# Patient Record
Sex: MALE | Race: WHITE | Employment: FULL TIME | URBAN - METROPOLITAN AREA
[De-identification: names, ages, dates, MRNs, and addresses within clinical notes are randomized per-mention and may not be internally consistent; named-entity substitution may affect disease eponyms.]

---

## 2019-07-03 ENCOUNTER — OP HISTORICAL/CONVERTED ENCOUNTER (OUTPATIENT)
Dept: OTHER | Age: 54
End: 2019-07-03

## 2022-07-17 ENCOUNTER — APPOINTMENT (OUTPATIENT)
Dept: GENERAL RADIOLOGY | Age: 57
End: 2022-07-17
Attending: EMERGENCY MEDICINE
Payer: COMMERCIAL

## 2022-07-17 ENCOUNTER — HOSPITAL ENCOUNTER (EMERGENCY)
Age: 57
Discharge: HOME | End: 2022-07-17
Attending: EMERGENCY MEDICINE
Payer: COMMERCIAL

## 2022-07-17 VITALS
HEART RATE: 54 BPM | BODY MASS INDEX: 23.7 KG/M2 | SYSTOLIC BLOOD PRESSURE: 140 MMHG | OXYGEN SATURATION: 99 % | RESPIRATION RATE: 18 BRPM | WEIGHT: 175 LBS | TEMPERATURE: 97.5 F | DIASTOLIC BLOOD PRESSURE: 77 MMHG | HEIGHT: 72 IN

## 2022-07-17 DIAGNOSIS — J20.9 ACUTE BRONCHITIS, UNSPECIFIED ORGANISM: Primary | ICD-10-CM

## 2022-07-17 DIAGNOSIS — R07.9 CHEST PAIN, UNSPECIFIED TYPE: ICD-10-CM

## 2022-07-17 DIAGNOSIS — R00.1 BRADYCARDIA BY ELECTROCARDIOGRAM: ICD-10-CM

## 2022-07-17 LAB
ALBUMIN SERPL-MCNC: 4 G/DL (ref 3.5–5)
ALBUMIN/GLOB SERPL: 1 {RATIO} (ref 1.1–2.2)
ALP SERPL-CCNC: 60 U/L (ref 45–117)
ALT SERPL-CCNC: 65 U/L (ref 12–78)
ANION GAP SERPL CALC-SCNC: 9 MMOL/L (ref 5–15)
AST SERPL W P-5'-P-CCNC: 49 U/L (ref 15–37)
BASOPHILS # BLD: 0 K/UL (ref 0–0.1)
BASOPHILS NFR BLD: 0 % (ref 0–1)
BILIRUB SERPL-MCNC: 1.1 MG/DL (ref 0.2–1)
BUN SERPL-MCNC: 19 MG/DL (ref 6–20)
BUN/CREAT SERPL: 12 (ref 12–20)
CA-I BLD-MCNC: 8.4 MG/DL (ref 8.5–10.1)
CHLORIDE SERPL-SCNC: 101 MMOL/L (ref 97–108)
CO2 SERPL-SCNC: 28 MMOL/L (ref 21–32)
CREAT SERPL-MCNC: 1.56 MG/DL (ref 0.7–1.3)
D DIMER PPP FEU-MCNC: 0.39 MG/L FEU (ref 0.19–0.5)
DIFFERENTIAL METHOD BLD: ABNORMAL
EOSINOPHIL # BLD: 0.1 K/UL (ref 0–0.4)
EOSINOPHIL NFR BLD: 1 % (ref 0–7)
ERYTHROCYTE [DISTWIDTH] IN BLOOD BY AUTOMATED COUNT: 12.6 % (ref 11.5–14.5)
FLUAV AG NPH QL IA: NEGATIVE
FLUBV AG NOSE QL IA: NEGATIVE
GLOBULIN SER CALC-MCNC: 4 G/DL (ref 2–4)
GLUCOSE SERPL-MCNC: 107 MG/DL (ref 65–100)
HCT VFR BLD AUTO: 47.1 % (ref 36.6–50.3)
HGB BLD-MCNC: 16.3 G/DL (ref 12.1–17)
IMM GRANULOCYTES # BLD AUTO: 0 K/UL (ref 0–0.04)
IMM GRANULOCYTES NFR BLD AUTO: 0 % (ref 0–0.5)
LYMPHOCYTES # BLD: 2 K/UL (ref 0.8–3.5)
LYMPHOCYTES NFR BLD: 36 % (ref 12–49)
MCH RBC QN AUTO: 31.8 PG (ref 26–34)
MCHC RBC AUTO-ENTMCNC: 34.6 G/DL (ref 30–36.5)
MCV RBC AUTO: 91.8 FL (ref 80–99)
MONOCYTES # BLD: 0.8 K/UL (ref 0–1)
MONOCYTES NFR BLD: 14 % (ref 5–13)
NEUTS SEG # BLD: 2.7 K/UL (ref 1.8–8)
NEUTS SEG NFR BLD: 49 % (ref 32–75)
PLATELET # BLD AUTO: 206 K/UL (ref 150–400)
PMV BLD AUTO: 10.9 FL (ref 8.9–12.9)
POTASSIUM SERPL-SCNC: 3.8 MMOL/L (ref 3.5–5.1)
PROT SERPL-MCNC: 8 G/DL (ref 6.4–8.2)
RBC # BLD AUTO: 5.13 M/UL (ref 4.1–5.7)
SODIUM SERPL-SCNC: 138 MMOL/L (ref 136–145)
TROPONIN-HIGH SENSITIVITY: 17 NG/L (ref 0–76)
WBC # BLD AUTO: 5.6 K/UL (ref 4.1–11.1)

## 2022-07-17 PROCEDURE — 71046 X-RAY EXAM CHEST 2 VIEWS: CPT

## 2022-07-17 PROCEDURE — 36415 COLL VENOUS BLD VENIPUNCTURE: CPT

## 2022-07-17 PROCEDURE — 85379 FIBRIN DEGRADATION QUANT: CPT

## 2022-07-17 PROCEDURE — 93005 ELECTROCARDIOGRAM TRACING: CPT

## 2022-07-17 PROCEDURE — 85025 COMPLETE CBC W/AUTO DIFF WBC: CPT

## 2022-07-17 PROCEDURE — 84484 ASSAY OF TROPONIN QUANT: CPT

## 2022-07-17 PROCEDURE — 87804 INFLUENZA ASSAY W/OPTIC: CPT

## 2022-07-17 PROCEDURE — 99285 EMERGENCY DEPT VISIT HI MDM: CPT

## 2022-07-17 PROCEDURE — 80053 COMPREHEN METABOLIC PANEL: CPT

## 2022-07-17 RX ORDER — AZITHROMYCIN 250 MG/1
TABLET, FILM COATED ORAL
Qty: 6 TABLET | Refills: 0 | Status: SHIPPED | OUTPATIENT
Start: 2022-07-17

## 2022-07-17 RX ORDER — LOSARTAN POTASSIUM 100 MG/1
TABLET ORAL
COMMUNITY
Start: 2022-04-27

## 2022-07-17 RX ORDER — METOPROLOL TARTRATE 50 MG/1
TABLET ORAL
COMMUNITY
Start: 2022-06-15

## 2022-07-17 RX ORDER — ATORVASTATIN CALCIUM 40 MG/1
TABLET, FILM COATED ORAL
COMMUNITY
Start: 2022-07-06

## 2022-07-17 NOTE — LETTER
[address] EMERGENCY DEPARTMENT  [address] [address]  [phone]    Work/School Note    Date: 7/17/2022    To Whom It May concern:    Carlene Fregoso was seen and treated today in the emergency room by the following provider(s):  Attending Provider: Tip Maier MD.      Carlene Fregoso is excused from work/school on 7/17/2022 through 7/19/2022. He is medically clear to return to work/school on 7/20/2022.          Sincerely,          Jennifer Live RN

## 2022-07-17 NOTE — ED PROVIDER NOTES
EMERGENCY DEPARTMENT HISTORY AND PHYSICAL EXAM      Date: 7/17/2022  Patient Name: Lauri Lam      History of Presenting Illness     Chief Complaint   Patient presents with    Chest Pain    Breathing Problem    Cough    Rib Pain       History Provided By: Patient    HPI: Lauri Lam, 60 y.o. male with a past medical history significant hypertension, hyperlipidemia and CAD presents to the ED with cc of Cough, SOB, rib pains associated with coughing and worse with bending over. Started 4 days ago. Improved Thursday but got worse again and persisted after work on Thursday. States he 'worded his complaints' wrong and did not mean to say chest pain. States he did have chest tightness with coughing. Has used nothing for symptoms. There are no other complaints, changes, or physical findings at this time. PCP: Zaida Martin, DO    Current Outpatient Medications   Medication Sig Dispense Refill    azithromycin (Zithromax Z-Delroy) 250 mg tablet 2 tabs by mouth day 1. 1 tab by mouth days 2 through 5 6 Tablet 0    atorvastatin (LIPITOR) 40 mg tablet       losartan (COZAAR) 100 mg tablet       metoprolol tartrate (LOPRESSOR) 50 mg tablet          Past History   Past Medical History:  Past Medical History:   Diagnosis Date    CAD (coronary artery disease)     High cholesterol     Hypertension        Past Surgical History:  Past Surgical History:   Procedure Laterality Date    HX CORONARY ARTERY BYPASS GRAFT         Family History:  History reviewed. No pertinent family history. Social History:  Social History     Tobacco Use    Smoking status: Never Smoker    Smokeless tobacco: Never Used   Substance Use Topics    Alcohol use: Not on file    Drug use: Not on file       Allergies:  No Known Allergies  Review of Systems   Review of Systems   Constitutional: Positive for diaphoresis. HENT: Negative. Respiratory: Positive for cough and shortness of breath. Cardiovascular: Negative. Gastrointestinal: Negative. Genitourinary: Negative. Musculoskeletal: Negative. Neurological: Negative. All other systems reviewed and are negative. Physical Exam   Physical Exam  Vitals and nursing note reviewed. Constitutional:       General: He is not in acute distress. Appearance: He is well-developed. He is not ill-appearing. HENT:      Head: Normocephalic. Eyes:      Extraocular Movements: Extraocular movements intact. Pupils: Pupils are equal, round, and reactive to light. Cardiovascular:      Rate and Rhythm: Normal rate and regular rhythm. No extrasystoles are present. Chest Wall: PMI is not displaced. Heart sounds: Normal heart sounds. Pulmonary:      Breath sounds: Normal breath sounds. Abdominal:      Palpations: Abdomen is soft. Musculoskeletal:         General: Normal range of motion. Skin:     General: Skin is warm. Neurological:      General: No focal deficit present. Mental Status: He is alert and oriented to person, place, and time. Lab and Diagnostic Study Results   Labs -     Recent Results (from the past 12 hour(s))   CBC WITH AUTOMATED DIFF    Collection Time: 07/17/22 10:05 AM   Result Value Ref Range    WBC 5.6 4.1 - 11.1 K/uL    RBC 5.13 4.10 - 5.70 M/uL    HGB 16.3 12.1 - 17.0 g/dL    HCT 47.1 36.6 - 50.3 %    MCV 91.8 80.0 - 99.0 FL    MCH 31.8 26.0 - 34.0 PG    MCHC 34.6 30.0 - 36.5 g/dL    RDW 12.6 11.5 - 14.5 %    PLATELET 012 747 - 227 K/uL    MPV 10.9 8.9 - 12.9 FL    NEUTROPHILS 49 32 - 75 %    LYMPHOCYTES 36 12 - 49 %    MONOCYTES 14 (H) 5 - 13 %    EOSINOPHILS 1 0 - 7 %    BASOPHILS 0 0 - 1 %    IMMATURE GRANULOCYTES 0 0.0 - 0.5 %    ABS. NEUTROPHILS 2.7 1.8 - 8.0 K/UL    ABS. LYMPHOCYTES 2.0 0.8 - 3.5 K/UL    ABS. MONOCYTES 0.8 0.0 - 1.0 K/UL    ABS. EOSINOPHILS 0.1 0.0 - 0.4 K/UL    ABS. BASOPHILS 0.0 0.0 - 0.1 K/UL    ABS. IMM.  GRANS. 0.0 0.00 - 0.04 K/UL    DF AUTOMATED     METABOLIC PANEL, COMPREHENSIVE Collection Time: 07/17/22 10:05 AM   Result Value Ref Range    Sodium 138 136 - 145 mmol/L    Potassium 3.8 3.5 - 5.1 mmol/L    Chloride 101 97 - 108 mmol/L    CO2 28 21 - 32 mmol/L    Anion gap 9 5 - 15 mmol/L    Glucose 107 (H) 65 - 100 mg/dL    BUN 19 6 - 20 mg/dL    Creatinine 1.56 (H) 0.70 - 1.30 mg/dL    BUN/Creatinine ratio 12 12 - 20      GFR est AA 56 (L) >60 ml/min/1.73m2    GFR est non-AA 46 (L) >60 ml/min/1.73m2    Calcium 8.4 (L) 8.5 - 10.1 mg/dL    Bilirubin, total 1.1 (H) 0.2 - 1.0 mg/dL    AST (SGOT) 49 (H) 15 - 37 U/L    ALT (SGPT) 65 12 - 78 U/L    Alk. phosphatase 60 45 - 117 U/L    Protein, total 8.0 6.4 - 8.2 g/dL    Albumin 4.0 3.5 - 5.0 g/dL    Globulin 4.0 2.0 - 4.0 g/dL    A-G Ratio 1.0 (L) 1.1 - 2.2     TROPONIN-HIGH SENSITIVITY    Collection Time: 07/17/22 10:05 AM   Result Value Ref Range    Troponin-High Sensitivity 17 0 - 76 ng/L   INFLUENZA A & B AG (RAPID TEST)    Collection Time: 07/17/22 10:40 AM   Result Value Ref Range    Influenza A Antigen Negative Negative      Influenza B Antigen Negative Negative     D DIMER    Collection Time: 07/17/22 10:40 AM   Result Value Ref Range    D-dimer 0.39 0.19 - 0.50 mg/L FEU       Radiologic Studies -   [unfilled]  CT Results  (Last 48 hours)    None        CXR Results  (Last 48 hours)               07/17/22 1041  XR CHEST PA LAT Final result    Impression:  No acute cardiopulmonary findings. Narrative:  EXAM: XR CHEST PA LAT       INDICATION: chest pain, dyspnea       COMPARISON: Chest radiograph May 24, 2015. FINDINGS: PA and lateral chest radiographs. Patient is status post sternotomy   and CABG. The lungs are adequately expanded. The heart size is within normal   limits. There is no focal airspace consolidation. The vascular clarity is within   normal limits. There is no evidence of pleural effusion or pneumothorax. No   displaced fracture is seen.                  Medical Decision Making and ED Course   - I am the first and primary provider for this patient AND AM THE PRIMARY PROVIDER OF RECORD. I reviewed the vital signs, available nursing notes, past medical history, past surgical history, family history and social history. - Initial assessment performed. The patients presenting problems have been discussed, and the staff are in agreement with the care plan formulated and outlined with them. I have encouraged them to ask questions as they arise throughout their visit. Differential Diagnosis & Medical Decision Making Provider Note:     MDM  Number of Diagnoses or Management Options     Amount and/or Complexity of Data Reviewed  Clinical lab tests: ordered and reviewed  Tests in the radiology section of CPT®: ordered and reviewed    Risk of Complications, Morbidity, and/or Mortality  Presenting problems: moderate  Diagnostic procedures: moderate  Management options: moderate  General comments: Refused repeat  Troponin and observation         Vital Signs-Reviewed the patient's vital signs. Patient Vitals for the past 12 hrs:   Temp Pulse Resp BP SpO2   07/17/22 1001 97.5 °F (36.4 °C) (!) 54 18 (!) 140/77 99 %       EKG interpretation: (Preliminary): Performed at 9:57. EKG Interpreted by me. Shows Sinus bradycardia, 55/min. LAE. Normal segments    ED Course:            Procedures and Critical Care     Performed by: Dee العراقي MD  Procedures          Disposition   Disposition: [place] Discharge: I informed the pt that they needed admission, further observation and further workup for adequate evaluation for their chest pain and that by refusing the above, they at risk for myocardial infarction, arrythmia, sudden death and further deterioration. They are awake, alert, and they understand their condition and the risks involved in leaving. They are clinically aware of their surroundings and able to ask appropriate questions.  The patient and the nurse present confirms They are not clinically intoxicated and able to make medical decisions. They have verbalized knowing the risks and understood it was recommended that they stay and could also return at any time. The patient was present for the discussion and also verbalized that they understood both diagnosis, risks and could return at any time. They were both provided with warnings regarding worsening of Their condition and were provided instructions to follow up with their PCP tomorrow or return to the Emergency Room as soon as possible. This discussion was witnessed by nurse RN. DISCHARGE PLAN:  1. Current Discharge Medication List      CONTINUE these medications which have NOT CHANGED    Details   atorvastatin (LIPITOR) 40 mg tablet       losartan (COZAAR) 100 mg tablet       metoprolol tartrate (LOPRESSOR) 50 mg tablet            2.   Follow-up Information     Follow up With Specialties Details Why [address] [facility], [address] [place] [address], [address] [place] [address] In 1 week  [address] [facility]      Sushant Pearl  [address] Vascular Surgery, Cardiovascular Disease Physician In 1 day  [address] [facility] [address] [facility]  [phone]          3. Return to ED if worse   4. Current Discharge Medication List      START taking these medications    Details   azithromycin (Zithromax Z-Delroy) 250 mg tablet 2 tabs by mouth day 1. 1 tab by mouth days 2 through 5  Qty: 6 Tablet, Refills: 0  Start date: 7/17/2022           Remove if not discharged    Diagnosis/Clinical Impression     Clinical Impression:   1. Acute bronchitis, unspecified organism    2. Chest pain, unspecified type    3. Bradycardia by electrocardiogram        Attestations:  Jag Azul MD    Please note that this dictation was completed with [employer], the computer voice recognition software. Quite often unanticipated grammatical, syntax, homophones, and other interpretive errors are inadvertently transcribed by the computer software.   Please disregard these errors. Please excuse any errors that have escaped final proofreading. Thank you.

## 2022-07-19 LAB
ATRIAL RATE: 55 BPM
CALCULATED P AXIS, ECG09: 52 DEGREES
CALCULATED R AXIS, ECG10: 9 DEGREES
CALCULATED T AXIS, ECG11: 59 DEGREES
DIAGNOSIS, 93000: NORMAL
P-R INTERVAL, ECG05: 136 MS
Q-T INTERVAL, ECG07: 416 MS
QRS DURATION, ECG06: 88 MS
QTC CALCULATION (BEZET), ECG08: 397 MS
VENTRICULAR RATE, ECG03: 55 BPM

## 2023-11-20 NOTE — ED TRIAGE NOTES
Patient reports chest pain in the middle, hurts even more when bending over, feels like he did when he had pneumonia in the past.
no